# Patient Record
Sex: FEMALE | Race: BLACK OR AFRICAN AMERICAN | NOT HISPANIC OR LATINO
[De-identification: names, ages, dates, MRNs, and addresses within clinical notes are randomized per-mention and may not be internally consistent; named-entity substitution may affect disease eponyms.]

---
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
## 2025-01-07 NOTE — REASON FOR VISIT
No [Follow Up] : a follow up visit [Patient] : patient [FreeTextEntry1] : Left knee pain, history of medial meniscus tear